# Patient Record
Sex: FEMALE | Race: BLACK OR AFRICAN AMERICAN | Employment: FULL TIME | URBAN - METROPOLITAN AREA
[De-identification: names, ages, dates, MRNs, and addresses within clinical notes are randomized per-mention and may not be internally consistent; named-entity substitution may affect disease eponyms.]

---

## 2024-12-03 ENCOUNTER — OFFICE VISIT (OUTPATIENT)
Facility: CLINIC | Age: 34
End: 2024-12-03
Payer: COMMERCIAL

## 2024-12-03 ENCOUNTER — HOSPITAL ENCOUNTER (OUTPATIENT)
Facility: HOSPITAL | Age: 34
Setting detail: SPECIMEN
Discharge: HOME | End: 2024-12-06
Payer: COMMERCIAL

## 2024-12-03 VITALS
HEIGHT: 65 IN | BODY MASS INDEX: 41.99 KG/M2 | RESPIRATION RATE: 18 BRPM | TEMPERATURE: 98.4 F | DIASTOLIC BLOOD PRESSURE: 82 MMHG | SYSTOLIC BLOOD PRESSURE: 156 MMHG | OXYGEN SATURATION: 98 % | HEART RATE: 108 BPM | WEIGHT: 252 LBS

## 2024-12-03 DIAGNOSIS — Z83.3 FAMILY HISTORY OF DIABETES MELLITUS IN FATHER: ICD-10-CM

## 2024-12-03 DIAGNOSIS — Z13.220 SCREENING FOR LIPID DISORDERS: ICD-10-CM

## 2024-12-03 DIAGNOSIS — Z98.84 HX OF LAPAROSCOPIC GASTRIC BANDING: ICD-10-CM

## 2024-12-03 DIAGNOSIS — K21.9 GASTROESOPHAGEAL REFLUX DISEASE WITHOUT ESOPHAGITIS: Primary | ICD-10-CM

## 2024-12-03 DIAGNOSIS — E11.65 TYPE 2 DIABETES MELLITUS WITH HYPERGLYCEMIA, WITHOUT LONG-TERM CURRENT USE OF INSULIN (HCC): ICD-10-CM

## 2024-12-03 DIAGNOSIS — E55.9 VITAMIN D DEFICIENCY: ICD-10-CM

## 2024-12-03 DIAGNOSIS — R63.5 WEIGHT GAIN: ICD-10-CM

## 2024-12-03 DIAGNOSIS — D50.8 IRON DEFICIENCY ANEMIA SECONDARY TO INADEQUATE DIETARY IRON INTAKE: ICD-10-CM

## 2024-12-03 DIAGNOSIS — R06.83 HABITUAL SNORING: ICD-10-CM

## 2024-12-03 DIAGNOSIS — E66.813 CLASS 3 SEVERE OBESITY WITH SERIOUS COMORBIDITY AND BODY MASS INDEX (BMI) OF 40.0 TO 44.9 IN ADULT, UNSPECIFIED OBESITY TYPE: ICD-10-CM

## 2024-12-03 DIAGNOSIS — K59.09 OTHER CONSTIPATION: ICD-10-CM

## 2024-12-03 DIAGNOSIS — F41.9 ANXIETY: ICD-10-CM

## 2024-12-03 DIAGNOSIS — E66.01 CLASS 3 SEVERE OBESITY WITH SERIOUS COMORBIDITY AND BODY MASS INDEX (BMI) OF 40.0 TO 44.9 IN ADULT, UNSPECIFIED OBESITY TYPE: ICD-10-CM

## 2024-12-03 PROBLEM — D64.9 ANEMIA: Status: ACTIVE | Noted: 2023-05-21

## 2024-12-03 LAB
25(OH)D3 SERPL-MCNC: 23.6 NG/ML (ref 30–100)
ALBUMIN SERPL-MCNC: 3.6 G/DL (ref 3.4–5)
ALBUMIN/GLOB SERPL: 0.9 (ref 0.8–1.7)
ALP SERPL-CCNC: 65 U/L (ref 45–117)
ALT SERPL-CCNC: 21 U/L (ref 13–56)
ANION GAP SERPL CALC-SCNC: 5 MMOL/L (ref 3–18)
AST SERPL-CCNC: 12 U/L (ref 10–38)
BILIRUB SERPL-MCNC: 0.3 MG/DL (ref 0.2–1)
BUN SERPL-MCNC: 8 MG/DL (ref 7–18)
BUN/CREAT SERPL: 12 (ref 12–20)
CALCIUM SERPL-MCNC: 9.6 MG/DL (ref 8.5–10.1)
CHLORIDE SERPL-SCNC: 107 MMOL/L (ref 100–111)
CHOLEST SERPL-MCNC: 158 MG/DL
CO2 SERPL-SCNC: 25 MMOL/L (ref 21–32)
CREAT SERPL-MCNC: 0.67 MG/DL (ref 0.6–1.3)
CREAT UR-MCNC: 210 MG/DL (ref 30–125)
ERYTHROCYTE [DISTWIDTH] IN BLOOD BY AUTOMATED COUNT: 19.3 % (ref 11.6–14.5)
EST. AVERAGE GLUCOSE BLD GHB EST-MCNC: 111 MG/DL
FOLATE SERPL-MCNC: 4.9 NG/ML (ref 3.1–17.5)
GLOBULIN SER CALC-MCNC: 3.9 G/DL (ref 2–4)
GLUCOSE SERPL-MCNC: 89 MG/DL (ref 74–99)
HBA1C MFR BLD: 5.5 % (ref 4.2–5.6)
HCT VFR BLD AUTO: 26.3 % (ref 35–45)
HDLC SERPL-MCNC: 49 MG/DL (ref 40–60)
HDLC SERPL: 3.2 (ref 0–5)
HGB BLD-MCNC: 6.9 G/DL (ref 12–16)
IRON SATN MFR SERPL: 2 % (ref 20–50)
IRON SERPL-MCNC: 9 UG/DL (ref 50–175)
LDLC SERPL CALC-MCNC: 95 MG/DL (ref 0–100)
LIPID PANEL: NORMAL
MCH RBC QN AUTO: 16.9 PG (ref 24–34)
MCHC RBC AUTO-ENTMCNC: 26.2 G/DL (ref 31–37)
MCV RBC AUTO: 64.5 FL (ref 78–100)
MICROALBUMIN UR-MCNC: 1.47 MG/DL (ref 0–3)
MICROALBUMIN/CREAT UR-RTO: 7 MG/G (ref 0–30)
NRBC # BLD: 0 K/UL (ref 0–0.01)
NRBC BLD-RTO: 0 PER 100 WBC
PLATELET # BLD AUTO: 564 K/UL (ref 135–420)
PMV BLD AUTO: 9.5 FL (ref 9.2–11.8)
POTASSIUM SERPL-SCNC: 4 MMOL/L (ref 3.5–5.5)
PROT SERPL-MCNC: 7.5 G/DL (ref 6.4–8.2)
RBC # BLD AUTO: 4.08 M/UL (ref 4.2–5.3)
SODIUM SERPL-SCNC: 137 MMOL/L (ref 136–145)
TIBC SERPL-MCNC: 522 UG/DL (ref 250–450)
TRIGL SERPL-MCNC: 70 MG/DL
TSH SERPL DL<=0.05 MIU/L-ACNC: 1.28 UIU/ML (ref 0.36–3.74)
VIT B12 SERPL-MCNC: 422 PG/ML (ref 211–911)
VLDLC SERPL CALC-MCNC: 14 MG/DL
WBC # BLD AUTO: 6.4 K/UL (ref 4.6–13.2)

## 2024-12-03 PROCEDURE — 36415 COLL VENOUS BLD VENIPUNCTURE: CPT

## 2024-12-03 PROCEDURE — 82570 ASSAY OF URINE CREATININE: CPT

## 2024-12-03 PROCEDURE — 83550 IRON BINDING TEST: CPT

## 2024-12-03 PROCEDURE — 82607 VITAMIN B-12: CPT

## 2024-12-03 PROCEDURE — 83540 ASSAY OF IRON: CPT

## 2024-12-03 PROCEDURE — 82746 ASSAY OF FOLIC ACID SERUM: CPT

## 2024-12-03 PROCEDURE — 80061 LIPID PANEL: CPT

## 2024-12-03 PROCEDURE — 82043 UR ALBUMIN QUANTITATIVE: CPT

## 2024-12-03 PROCEDURE — 82306 VITAMIN D 25 HYDROXY: CPT

## 2024-12-03 PROCEDURE — 99204 OFFICE O/P NEW MOD 45 MIN: CPT | Performed by: NURSE PRACTITIONER

## 2024-12-03 PROCEDURE — 85027 COMPLETE CBC AUTOMATED: CPT

## 2024-12-03 PROCEDURE — 80053 COMPREHEN METABOLIC PANEL: CPT

## 2024-12-03 PROCEDURE — 84443 ASSAY THYROID STIM HORMONE: CPT

## 2024-12-03 PROCEDURE — 83036 HEMOGLOBIN GLYCOSYLATED A1C: CPT

## 2024-12-03 PROCEDURE — 3044F HG A1C LEVEL LT 7.0%: CPT | Performed by: NURSE PRACTITIONER

## 2024-12-03 RX ORDER — PANTOPRAZOLE SODIUM 20 MG/1
20 TABLET, DELAYED RELEASE ORAL DAILY
COMMUNITY
Start: 2024-10-14 | End: 2024-12-03 | Stop reason: SDUPTHER

## 2024-12-03 RX ORDER — DOCUSATE SODIUM 100 MG/1
100 CAPSULE, LIQUID FILLED ORAL DAILY PRN
Qty: 30 CAPSULE | Refills: 0 | Status: SHIPPED | OUTPATIENT
Start: 2024-12-03

## 2024-12-03 RX ORDER — ESCITALOPRAM OXALATE 20 MG/1
20 TABLET ORAL DAILY
Qty: 90 TABLET | Refills: 2 | Status: SHIPPED | OUTPATIENT
Start: 2024-12-03

## 2024-12-03 RX ORDER — ESCITALOPRAM OXALATE 10 MG/1
10 TABLET ORAL DAILY
COMMUNITY
Start: 2024-10-14 | End: 2024-12-03 | Stop reason: SDUPTHER

## 2024-12-03 RX ORDER — PANTOPRAZOLE SODIUM 40 MG/1
40 TABLET, DELAYED RELEASE ORAL DAILY
Qty: 90 TABLET | Refills: 1 | Status: SHIPPED | OUTPATIENT
Start: 2024-12-03

## 2024-12-03 SDOH — ECONOMIC STABILITY: FOOD INSECURITY: WITHIN THE PAST 12 MONTHS, YOU WORRIED THAT YOUR FOOD WOULD RUN OUT BEFORE YOU GOT MONEY TO BUY MORE.: NEVER TRUE

## 2024-12-03 SDOH — ECONOMIC STABILITY: FOOD INSECURITY: WITHIN THE PAST 12 MONTHS, THE FOOD YOU BOUGHT JUST DIDN'T LAST AND YOU DIDN'T HAVE MONEY TO GET MORE.: NEVER TRUE

## 2024-12-03 SDOH — ECONOMIC STABILITY: INCOME INSECURITY: HOW HARD IS IT FOR YOU TO PAY FOR THE VERY BASICS LIKE FOOD, HOUSING, MEDICAL CARE, AND HEATING?: NOT HARD AT ALL

## 2024-12-03 ASSESSMENT — PATIENT HEALTH QUESTIONNAIRE - PHQ9
SUM OF ALL RESPONSES TO PHQ QUESTIONS 1-9: 0
2. FEELING DOWN, DEPRESSED OR HOPELESS: NOT AT ALL
SUM OF ALL RESPONSES TO PHQ QUESTIONS 1-9: 0
1. LITTLE INTEREST OR PLEASURE IN DOING THINGS: NOT AT ALL
SUM OF ALL RESPONSES TO PHQ9 QUESTIONS 1 & 2: 0

## 2024-12-03 NOTE — PROGRESS NOTES
Patient last menstrual cycle was 11/5.    Lary Jackson presents today for   Chief Complaint   Patient presents with    New Patient    Gastroesophageal Reflux    Diabetes    Anemia    Headache    Anxiety       Is someone accompanying this pt? no    Is the patient using any DME equipment during OV? No     Depression Screening:       No data to display                Learning Assessment:  Failed to redirect to the Timeline version of the REVFS SmartLink.    Health Maintenance reviewed and discussed and ordered per Provider.    Health Maintenance Due   Topic Date Due    Pneumococcal 0-64 years Vaccine (1 of 2 - PCV) Never done    Diabetic foot exam  Never done    A1C test (Diabetic or Prediabetic)  Never done    Lipids  Never done    Depression Screen  Never done    HIV screen  Never done    Diabetic Alb to Cr ratio (uACR) test  Never done    Diabetic retinal exam  Never done    GFR test (Diabetes, CKD 3-4, OR last GFR 15-59)  Never done    Hepatitis C screen  Never done    Hepatitis B vaccine (2 of 3 - 3-dose series) 07/28/2007    Varicella vaccine (2 of 2 - 13+ 2-dose series) 07/28/2007    Hepatitis A vaccine (2 of 2 - 2-dose series) 12/30/2007    Cervical cancer screen  Never done     \"Have you been to the ER, urgent care clinic since your last visit?  Hospitalized since your last visit?\"    No     “Have you seen or consulted any other health care providers outside our system since your last visit?”    PCP Dr. Holden     “Have you had a pap smear?”    Yes [facility]     No cervical cancer screening on file       “Have you had a diabetic eye exam?”     Yes [facility]     No diabetic eye exam on file

## 2024-12-03 NOTE — PROGRESS NOTES
[address] [place] [address]               [phone]      Lary Jackson is a 34 y.o. female and presents with New Patient, Gastroesophageal Reflux, Diabetes, Anemia, Headache, and Anxiety       Assessment/Plan:    1. Gastroesophageal reflux disease without esophagitis  -     pantoprazole (PROTONIX) 40 MG tablet; Take 1 tablet by mouth daily, Disp-90 tablet, R-1Normal  -     External Referral To Gastroenterology  2. Type 2 diabetes mellitus with hyperglycemia, without long-term current use of insulin ([facility])  -     Comprehensive Metabolic Panel; Future  -     Microalbumin / Creatinine Urine Ratio; Future  -     Hemoglobin A1C; Future  3. Anxiety  -     escitalopram (LEXAPRO) 20 MG tablet; Take 1 tablet by mouth daily, Disp-90 tablet, R-2Normal  4. Family history of diabetes mellitus in father  5. Other constipation  -     docusate sodium (COLACE) 100 MG capsule; Take 1 capsule by mouth daily as needed for Constipation, Disp-30 capsule, R-0Normal  6. Habitual snoring  -     External Referral To Sleep Medicine  7. Iron deficiency anemia secondary to inadequate dietary iron intake  -     CBC; Future  8. Hx of laparoscopic gastric banding  -     External Referral To Bariatrics  9. Vitamin D deficiency  -     Vitamin D 25 Hydroxy; Future  10. Screening for lipid disorders  -     Lipid Panel; Future  11. Weight gain  -     TSH; Future  -     External Referral To Bariatrics  12. Class 3 severe obesity with serious comorbidity and body mass index (BMI) of 40.0 to 44.9 in adult, unspecified obesity type  -     External Referral To Bariatrics         Follow up and disposition:   Return in about 4 weeks (around 12/31/2024) for annual physical.      Subjective:    Labs obtained prior to visit? No  Reviewed with patient? N/A    2017--lap band procedure    PAP 6 months ago    [facility] 11-5-2024    ROS:     Review of Systems   Constitutional:  Positive for fatigue and unexpected weight change.   HENT:

## 2024-12-06 DIAGNOSIS — E55.9 VITAMIN D DEFICIENCY: ICD-10-CM

## 2024-12-06 DIAGNOSIS — D50.8 IRON DEFICIENCY ANEMIA SECONDARY TO INADEQUATE DIETARY IRON INTAKE: Primary | ICD-10-CM

## 2024-12-06 RX ORDER — FERROUS SULFATE 325(65) MG
325 TABLET ORAL
Qty: 90 TABLET | Refills: 1 | Status: SHIPPED | OUTPATIENT
Start: 2024-12-06

## 2024-12-06 RX ORDER — ERGOCALCIFEROL 1.25 MG/1
50000 CAPSULE, LIQUID FILLED ORAL WEEKLY
Qty: 12 CAPSULE | Refills: 1 | Status: SHIPPED | OUTPATIENT
Start: 2024-12-06

## 2024-12-06 NOTE — RESULT ENCOUNTER NOTE
Tried calling patient in regards to lab results. No answer; left voicemail for patient to call office back.

## 2024-12-06 NOTE — RESULT ENCOUNTER NOTE
Please call patient ASAP. Her blood counts are extremely low and she has low iron levels. I know she is taking bariatrics vitamins but we need to iron supplements and possibly iron infusions. I have referred her to hematology office. Her vitamin D is low so I have sent vitamin D to take weekly. Her thyroid level is normal and her liver/kidneys/electrolytes are good. Her A1c is good at  5.5; cholesterol levels are normal too.

## 2024-12-09 ASSESSMENT — ENCOUNTER SYMPTOMS
SHORTNESS OF BREATH: 0
CHEST TIGHTNESS: 0
GASTROINTESTINAL NEGATIVE: 1
WHEEZING: 0
RESPIRATORY NEGATIVE: 1

## 2025-01-10 DIAGNOSIS — K59.09 OTHER CONSTIPATION: ICD-10-CM

## 2025-01-13 RX ORDER — DOCUSATE SODIUM 100 MG/1
CAPSULE, LIQUID FILLED ORAL
Qty: 30 CAPSULE | Refills: 0 | Status: SHIPPED | OUTPATIENT
Start: 2025-01-13

## 2025-01-13 RX ORDER — METOCLOPRAMIDE 10 MG/1
10 TABLET ORAL 4 TIMES DAILY
COMMUNITY
Start: 2024-12-29

## 2025-02-10 DIAGNOSIS — K59.09 OTHER CONSTIPATION: ICD-10-CM

## 2025-02-10 RX ORDER — DOCUSATE SODIUM 100 MG/1
CAPSULE, LIQUID FILLED ORAL
Qty: 30 CAPSULE | Refills: 0 | OUTPATIENT
Start: 2025-02-10

## 2025-02-10 NOTE — TELEPHONE ENCOUNTER
12-3-24 establish care last office visit  Return in about 4 weeks (around 12/31/2024) for annual physical   1-3-25 No show for appointment

## 2025-05-08 ENCOUNTER — OFFICE VISIT (OUTPATIENT)
Facility: CLINIC | Age: 35
End: 2025-05-08
Payer: COMMERCIAL

## 2025-05-08 VITALS
SYSTOLIC BLOOD PRESSURE: 136 MMHG | OXYGEN SATURATION: 98 % | TEMPERATURE: 98.6 F | WEIGHT: 248 LBS | HEIGHT: 65 IN | DIASTOLIC BLOOD PRESSURE: 88 MMHG | RESPIRATION RATE: 18 BRPM | BODY MASS INDEX: 41.32 KG/M2 | HEART RATE: 101 BPM

## 2025-05-08 DIAGNOSIS — R06.83 HABITUAL SNORING: ICD-10-CM

## 2025-05-08 DIAGNOSIS — Z86.39 HISTORY OF DIABETES MELLITUS, TYPE II: ICD-10-CM

## 2025-05-08 DIAGNOSIS — K21.9 GASTROESOPHAGEAL REFLUX DISEASE WITHOUT ESOPHAGITIS: ICD-10-CM

## 2025-05-08 DIAGNOSIS — E55.9 VITAMIN D DEFICIENCY: ICD-10-CM

## 2025-05-08 DIAGNOSIS — D50.8 IRON DEFICIENCY ANEMIA SECONDARY TO INADEQUATE DIETARY IRON INTAKE: Primary | ICD-10-CM

## 2025-05-08 DIAGNOSIS — Z98.84 HX OF LAPAROSCOPIC GASTRIC BANDING: ICD-10-CM

## 2025-05-08 PROBLEM — E11.65 TYPE 2 DIABETES MELLITUS WITH HYPERGLYCEMIA, WITHOUT LONG-TERM CURRENT USE OF INSULIN (HCC): Status: RESOLVED | Noted: 2024-04-19 | Resolved: 2025-05-08

## 2025-05-08 PROBLEM — R63.5 WEIGHT GAIN: Status: RESOLVED | Noted: 2024-12-03 | Resolved: 2025-05-08

## 2025-05-08 PROCEDURE — 99204 OFFICE O/P NEW MOD 45 MIN: CPT | Performed by: NURSE PRACTITIONER

## 2025-05-08 RX ORDER — ERGOCALCIFEROL 1.25 MG/1
50000 CAPSULE, LIQUID FILLED ORAL WEEKLY
Qty: 12 CAPSULE | Refills: 1 | Status: SHIPPED | OUTPATIENT
Start: 2025-05-08

## 2025-05-08 RX ORDER — SEMAGLUTIDE 1 MG/.5ML
INJECTION, SOLUTION SUBCUTANEOUS
COMMUNITY
Start: 2025-04-07

## 2025-05-08 RX ORDER — ONDANSETRON 4 MG/1
TABLET, ORALLY DISINTEGRATING ORAL
COMMUNITY
Start: 2025-02-17

## 2025-05-08 RX ORDER — NALTREXONE HYDROCHLORIDE 50 MG/1
TABLET, FILM COATED ORAL
COMMUNITY
Start: 2025-01-29 | End: 2025-05-08 | Stop reason: ALTCHOICE

## 2025-05-08 RX ORDER — BUPROPION HYDROCHLORIDE 150 MG/1
TABLET, EXTENDED RELEASE ORAL
COMMUNITY
Start: 2025-01-29 | End: 2025-05-08 | Stop reason: ALTCHOICE

## 2025-05-08 RX ORDER — FERROUS SULFATE 325(65) MG
325 TABLET ORAL
Qty: 90 TABLET | Refills: 1 | Status: SHIPPED | OUTPATIENT
Start: 2025-05-08

## 2025-05-08 RX ORDER — PANTOPRAZOLE SODIUM 40 MG/1
40 TABLET, DELAYED RELEASE ORAL DAILY
Qty: 90 TABLET | Refills: 1 | Status: SHIPPED | OUTPATIENT
Start: 2025-05-08

## 2025-05-08 ASSESSMENT — PATIENT HEALTH QUESTIONNAIRE - PHQ9
1. LITTLE INTEREST OR PLEASURE IN DOING THINGS: NOT AT ALL
SUM OF ALL RESPONSES TO PHQ QUESTIONS 1-9: 0
2. FEELING DOWN, DEPRESSED OR HOPELESS: NOT AT ALL
SUM OF ALL RESPONSES TO PHQ QUESTIONS 1-9: 0

## 2025-05-08 NOTE — PROGRESS NOTES
Lary Jackson is a 35 y.o. female and presents with Follow-up and Gastroesophageal Reflux (Is getting worse )       Assessment/Plan:    1. Iron deficiency anemia secondary to inadequate dietary iron intake  -     ferrous sulfate (IRON 325) 325 (65 Fe) MG tablet; Take 1 tablet by mouth daily (with breakfast), Disp-90 tablet, R-1Normal  -     Iron and TIBC; Future  -     CBC with Auto Differential; Future  -     Urinalysis with Microscopic; Future  2. Vitamin D deficiency  -     vitamin D (ERGOCALCIFEROL) 1.25 MG (40274 UT) CAPS capsule; Take 1 capsule by mouth once a week, Disp-12 capsule, R-1Normal  -     Vitamin D 25 Hydroxy; Future  3. Hx of laparoscopic gastric banding  4. Gastroesophageal reflux disease without esophagitis  -     pantoprazole (PROTONIX) 40 MG tablet; Take 1 tablet by mouth daily, Disp-90 tablet, R-1Normal  5. Habitual snoring  6. History of diabetes mellitus, type II  -     Hemoglobin A1C; Future  -     Comprehensive Metabolic Panel; Future         Follow up and disposition:   No follow-ups on file.      Subjective:    Labs obtained prior to visit? No  Reviewed with patient? N/A    PAP last year; need report records     LMP 04/2025    Patient has been working with  and just started wegovy injections     ROS:     Review of Systems   Constitutional:  Negative for fatigue.   HENT: Negative.     Respiratory: Negative.  Negative for chest tightness, shortness of breath and wheezing.    Cardiovascular: Negative.  Negative for chest pain, palpitations and leg swelling.   Gastrointestinal:  Negative for abdominal pain, blood in stool, constipation, nausea and vomiting.        Increased reflux    Genitourinary: Negative.  Negative for difficulty urinating, dysuria, frequency, menstrual problem and pelvic pain.   Musculoskeletal: Negative.  Negative for arthralgias and back pain.   Skin: Negative.    Neurological: Negative.  Negative for dizziness and headaches.   Psychiatric/Behavioral:

## 2025-05-08 NOTE — PROGRESS NOTES
Lary Jackson presents today for   Chief Complaint   Patient presents with    Follow-up    Gastroesophageal Reflux     Is getting worse        Is someone accompanying this pt? No     Is the patient using any DME equipment during OV? No         5/8/2025     3:13 PM   PHQ-9    Little interest or pleasure in doing things 0   Feeling down, depressed, or hopeless 0   PHQ-2 Score 0   PHQ-9 Total Score 0        Health Maintenance reviewed and discussed and ordered per Provider.    Health Maintenance Due   Topic Date Due    Diabetic foot exam  Never done    Cervical cancer screen  Never done    Diabetic retinal exam  03/14/2025   .        \"Have you been to the ER, urgent care clinic since your last visit?  Hospitalized since your last visit?\"    No     “Have you seen or consulted any other health care providers outside our system since your last visit?”    No      “Have you had a pap smear?”    Yes     No cervical cancer screening on file       “Have you had a diabetic eye exam?”    Yes      Date of last diabetic eye exam: 3/14/2024

## 2025-05-14 PROBLEM — Z86.39 HISTORY OF DIABETES MELLITUS, TYPE II: Status: ACTIVE | Noted: 2025-05-14

## 2025-05-14 ASSESSMENT — ENCOUNTER SYMPTOMS
CHEST TIGHTNESS: 0
CONSTIPATION: 0
NAUSEA: 0
WHEEZING: 0
BACK PAIN: 0
ABDOMINAL PAIN: 0
BLOOD IN STOOL: 0
RESPIRATORY NEGATIVE: 1
VOMITING: 0
SHORTNESS OF BREATH: 0

## 2025-07-29 DIAGNOSIS — B37.2 YEAST INFECTION OF THE SKIN: Primary | ICD-10-CM

## 2025-07-29 DIAGNOSIS — F41.9 ANXIETY: Primary | ICD-10-CM

## 2025-07-29 RX ORDER — ESCITALOPRAM OXALATE 5 MG/1
5 TABLET ORAL DAILY
Qty: 30 TABLET | Refills: 1 | Status: SHIPPED | OUTPATIENT
Start: 2025-07-29

## 2025-07-29 RX ORDER — NYSTATIN 100000 U/G
CREAM TOPICAL DAILY PRN
Qty: 30 G | Refills: 2 | Status: SHIPPED | OUTPATIENT
Start: 2025-07-29